# Patient Record
Sex: MALE | Race: BLACK OR AFRICAN AMERICAN | NOT HISPANIC OR LATINO | URBAN - METROPOLITAN AREA
[De-identification: names, ages, dates, MRNs, and addresses within clinical notes are randomized per-mention and may not be internally consistent; named-entity substitution may affect disease eponyms.]

---

## 2021-03-13 ENCOUNTER — INPATIENT (INPATIENT)
Facility: HOSPITAL | Age: 76
LOS: 3 days | Discharge: HOME HEALTH | End: 2021-03-17
Attending: INTERNAL MEDICINE | Admitting: INTERNAL MEDICINE
Payer: MEDICARE

## 2021-03-13 VITALS
TEMPERATURE: 98 F | WEIGHT: 229.94 LBS | RESPIRATION RATE: 17 BRPM | SYSTOLIC BLOOD PRESSURE: 84 MMHG | DIASTOLIC BLOOD PRESSURE: 55 MMHG | HEART RATE: 88 BPM | HEIGHT: 69 IN | OXYGEN SATURATION: 100 %

## 2021-03-13 DIAGNOSIS — R77.8 OTHER SPECIFIED ABNORMALITIES OF PLASMA PROTEINS: ICD-10-CM

## 2021-03-13 DIAGNOSIS — N30.01 ACUTE CYSTITIS WITH HEMATURIA: ICD-10-CM

## 2021-03-13 DIAGNOSIS — R33.9 RETENTION OF URINE, UNSPECIFIED: ICD-10-CM

## 2021-03-13 DIAGNOSIS — N17.9 ACUTE KIDNEY FAILURE, UNSPECIFIED: ICD-10-CM

## 2021-03-13 DIAGNOSIS — A41.9 SEPSIS, UNSPECIFIED ORGANISM: ICD-10-CM

## 2021-03-13 DIAGNOSIS — I95.9 HYPOTENSION, UNSPECIFIED: ICD-10-CM

## 2021-03-13 LAB
ALBUMIN SERPL ELPH-MCNC: 2.9 G/DL — LOW (ref 3.3–5)
ALP SERPL-CCNC: 63 U/L — SIGNIFICANT CHANGE UP (ref 40–120)
ALT FLD-CCNC: 52 U/L — SIGNIFICANT CHANGE UP (ref 12–78)
ANION GAP SERPL CALC-SCNC: 8 MMOL/L — SIGNIFICANT CHANGE UP (ref 5–17)
APPEARANCE UR: CLEAR — SIGNIFICANT CHANGE UP
APTT BLD: 27.5 SEC — SIGNIFICANT CHANGE UP (ref 27.5–35.5)
AST SERPL-CCNC: 71 U/L — HIGH (ref 15–37)
BACTERIA # UR AUTO: ABNORMAL
BASOPHILS # BLD AUTO: 0.01 K/UL — SIGNIFICANT CHANGE UP (ref 0–0.2)
BASOPHILS NFR BLD AUTO: 0.1 % — SIGNIFICANT CHANGE UP (ref 0–2)
BILIRUB SERPL-MCNC: 1.6 MG/DL — HIGH (ref 0.2–1.2)
BILIRUB UR-MCNC: ABNORMAL
BUN SERPL-MCNC: 58 MG/DL — HIGH (ref 7–23)
CALCIUM SERPL-MCNC: 8.4 MG/DL — LOW (ref 8.5–10.1)
CHLORIDE SERPL-SCNC: 96 MMOL/L — SIGNIFICANT CHANGE UP (ref 96–108)
CK SERPL-CCNC: 63 U/L — SIGNIFICANT CHANGE UP (ref 26–308)
CK SERPL-CCNC: 81 U/L — SIGNIFICANT CHANGE UP (ref 26–308)
CO2 SERPL-SCNC: 28 MMOL/L — SIGNIFICANT CHANGE UP (ref 22–31)
COLOR SPEC: ABNORMAL
COMMENT - URINE: SIGNIFICANT CHANGE UP
CREAT SERPL-MCNC: 3.54 MG/DL — HIGH (ref 0.5–1.3)
DIFF PNL FLD: NEGATIVE — SIGNIFICANT CHANGE UP
EOSINOPHIL # BLD AUTO: 0.05 K/UL — SIGNIFICANT CHANGE UP (ref 0–0.5)
EOSINOPHIL NFR BLD AUTO: 0.6 % — SIGNIFICANT CHANGE UP (ref 0–6)
EPI CELLS # UR: SIGNIFICANT CHANGE UP
FLUAV AG NPH QL: SIGNIFICANT CHANGE UP
FLUBV AG NPH QL: SIGNIFICANT CHANGE UP
GLUCOSE BLDC GLUCOMTR-MCNC: 124 MG/DL — HIGH (ref 70–99)
GLUCOSE SERPL-MCNC: 109 MG/DL — HIGH (ref 70–99)
GLUCOSE UR QL: NEGATIVE MG/DL — SIGNIFICANT CHANGE UP
GRAN CASTS # UR COMP ASSIST: ABNORMAL /LPF
HCT VFR BLD CALC: 37.8 % — LOW (ref 39–50)
HGB BLD-MCNC: 13.4 G/DL — SIGNIFICANT CHANGE UP (ref 13–17)
HYALINE CASTS # UR AUTO: ABNORMAL /LPF
IMM GRANULOCYTES NFR BLD AUTO: 0.4 % — SIGNIFICANT CHANGE UP (ref 0–1.5)
INR BLD: 0.98 RATIO — SIGNIFICANT CHANGE UP (ref 0.88–1.16)
KETONES UR-MCNC: ABNORMAL
LACTATE SERPL-SCNC: 1 MMOL/L — SIGNIFICANT CHANGE UP (ref 0.7–2)
LEUKOCYTE ESTERASE UR-ACNC: ABNORMAL
LYMPHOCYTES # BLD AUTO: 2.55 K/UL — SIGNIFICANT CHANGE UP (ref 1–3.3)
LYMPHOCYTES # BLD AUTO: 31.8 % — SIGNIFICANT CHANGE UP (ref 13–44)
MCHC RBC-ENTMCNC: 34.6 PG — HIGH (ref 27–34)
MCHC RBC-ENTMCNC: 35.4 GM/DL — SIGNIFICANT CHANGE UP (ref 32–36)
MCV RBC AUTO: 97.7 FL — SIGNIFICANT CHANGE UP (ref 80–100)
MONOCYTES # BLD AUTO: 0.47 K/UL — SIGNIFICANT CHANGE UP (ref 0–0.9)
MONOCYTES NFR BLD AUTO: 5.9 % — SIGNIFICANT CHANGE UP (ref 2–14)
NEUTROPHILS # BLD AUTO: 4.91 K/UL — SIGNIFICANT CHANGE UP (ref 1.8–7.4)
NEUTROPHILS NFR BLD AUTO: 61.2 % — SIGNIFICANT CHANGE UP (ref 43–77)
NITRITE UR-MCNC: NEGATIVE — SIGNIFICANT CHANGE UP
NRBC # BLD: 0 /100 WBCS — SIGNIFICANT CHANGE UP (ref 0–0)
PH UR: 5 — SIGNIFICANT CHANGE UP (ref 5–8)
PLATELET # BLD AUTO: 173 K/UL — SIGNIFICANT CHANGE UP (ref 150–400)
POTASSIUM SERPL-MCNC: 4 MMOL/L — SIGNIFICANT CHANGE UP (ref 3.5–5.3)
POTASSIUM SERPL-SCNC: 4 MMOL/L — SIGNIFICANT CHANGE UP (ref 3.5–5.3)
PROT SERPL-MCNC: 6.2 GM/DL — SIGNIFICANT CHANGE UP (ref 6–8.3)
PROT UR-MCNC: 30 MG/DL
PROTHROM AB SERPL-ACNC: 11.4 SEC — SIGNIFICANT CHANGE UP (ref 10.6–13.6)
RBC # BLD: 3.87 M/UL — LOW (ref 4.2–5.8)
RBC # FLD: 15.6 % — HIGH (ref 10.3–14.5)
RBC CASTS # UR COMP ASSIST: SIGNIFICANT CHANGE UP /HPF (ref 0–4)
SARS-COV-2 RNA SPEC QL NAA+PROBE: SIGNIFICANT CHANGE UP
SODIUM SERPL-SCNC: 132 MMOL/L — LOW (ref 135–145)
SP GR SPEC: 1.02 — SIGNIFICANT CHANGE UP (ref 1.01–1.02)
TROPONIN I SERPL-MCNC: 0.07 NG/ML — HIGH (ref 0.01–0.04)
TROPONIN I SERPL-MCNC: 0.08 NG/ML — HIGH (ref 0.01–0.04)
TROPONIN I SERPL-MCNC: 0.1 NG/ML — HIGH (ref 0.01–0.04)
UROBILINOGEN FLD QL: 4 MG/DL
WBC # BLD: 8.02 K/UL — SIGNIFICANT CHANGE UP (ref 3.8–10.5)
WBC # FLD AUTO: 8.02 K/UL — SIGNIFICANT CHANGE UP (ref 3.8–10.5)
WBC UR QL: ABNORMAL

## 2021-03-13 PROCEDURE — G1004: CPT

## 2021-03-13 PROCEDURE — 99285 EMERGENCY DEPT VISIT HI MDM: CPT

## 2021-03-13 PROCEDURE — 74176 CT ABD & PELVIS W/O CONTRAST: CPT | Mod: 26,MG

## 2021-03-13 PROCEDURE — 71045 X-RAY EXAM CHEST 1 VIEW: CPT | Mod: 26

## 2021-03-13 PROCEDURE — 93010 ELECTROCARDIOGRAM REPORT: CPT

## 2021-03-13 RX ORDER — CEFTRIAXONE 500 MG/1
1000 INJECTION, POWDER, FOR SOLUTION INTRAMUSCULAR; INTRAVENOUS EVERY 24 HOURS
Refills: 0 | Status: COMPLETED | OUTPATIENT
Start: 2021-03-14 | End: 2021-03-16

## 2021-03-13 RX ORDER — PANTOPRAZOLE SODIUM 20 MG/1
40 TABLET, DELAYED RELEASE ORAL
Refills: 0 | Status: DISCONTINUED | OUTPATIENT
Start: 2021-03-13 | End: 2021-03-17

## 2021-03-13 RX ORDER — CEFTRIAXONE 500 MG/1
1000 INJECTION, POWDER, FOR SOLUTION INTRAMUSCULAR; INTRAVENOUS ONCE
Refills: 0 | Status: COMPLETED | OUTPATIENT
Start: 2021-03-13 | End: 2021-03-13

## 2021-03-13 RX ORDER — SODIUM CHLORIDE 9 MG/ML
1000 INJECTION INTRAMUSCULAR; INTRAVENOUS; SUBCUTANEOUS
Refills: 0 | Status: DISCONTINUED | OUTPATIENT
Start: 2021-03-13 | End: 2021-03-17

## 2021-03-13 RX ORDER — SODIUM CHLORIDE 9 MG/ML
2200 INJECTION INTRAMUSCULAR; INTRAVENOUS; SUBCUTANEOUS ONCE
Refills: 0 | Status: COMPLETED | OUTPATIENT
Start: 2021-03-13 | End: 2021-03-13

## 2021-03-13 RX ORDER — ASPIRIN/CALCIUM CARB/MAGNESIUM 324 MG
325 TABLET ORAL ONCE
Refills: 0 | Status: COMPLETED | OUTPATIENT
Start: 2021-03-13 | End: 2021-03-13

## 2021-03-13 RX ORDER — HEPARIN SODIUM 5000 [USP'U]/ML
5000 INJECTION INTRAVENOUS; SUBCUTANEOUS EVERY 12 HOURS
Refills: 0 | Status: DISCONTINUED | OUTPATIENT
Start: 2021-03-13 | End: 2021-03-17

## 2021-03-13 RX ADMIN — SODIUM CHLORIDE 80 MILLILITER(S): 9 INJECTION INTRAMUSCULAR; INTRAVENOUS; SUBCUTANEOUS at 11:14

## 2021-03-13 RX ADMIN — CEFTRIAXONE 100 MILLIGRAM(S): 500 INJECTION, POWDER, FOR SOLUTION INTRAMUSCULAR; INTRAVENOUS at 08:48

## 2021-03-13 RX ADMIN — HEPARIN SODIUM 5000 UNIT(S): 5000 INJECTION INTRAVENOUS; SUBCUTANEOUS at 17:01

## 2021-03-13 RX ADMIN — Medication 325 MILLIGRAM(S): at 09:11

## 2021-03-13 RX ADMIN — SODIUM CHLORIDE 2200 MILLILITER(S): 9 INJECTION INTRAMUSCULAR; INTRAVENOUS; SUBCUTANEOUS at 07:41

## 2021-03-13 RX ADMIN — Medication 1 TABLET(S): at 11:14

## 2021-03-13 RX ADMIN — PANTOPRAZOLE SODIUM 40 MILLIGRAM(S): 20 TABLET, DELAYED RELEASE ORAL at 08:56

## 2021-03-13 NOTE — H&P ADULT - NSHPLABSRESULTS_GEN_ALL_CORE
LABS:                        13.4   8.02  )-----------( 173      ( 13 Mar 2021 07:20 )             37.8     03-    132<L>  |  96  |  58<H>  ----------------------------<  109<H>  4.0   |  28  |  3.54<H>    Ca    8.4<L>      13 Mar 2021 07:20    TPro  6.2  /  Alb  2.9<L>  /  TBili  1.6<H>  /  DBili  x   /  AST  71<H>  /  ALT  52  /  AlkPhos  63  03-13    PT/INR - ( 13 Mar 2021 07:20 )   PT: 11.4 sec;   INR: 0.98 ratio         PTT - ( 13 Mar 2021 07:20 )  PTT:27.5 sec  Urinalysis Basic - ( 13 Mar 2021 07:55 )    Color: Zunilda / Appearance: Clear / S.020 / pH: x  Gluc: x / Ketone: Trace  / Bili: Moderate / Urobili: 4 mg/dL   Blood: x / Protein: 30 mg/dL / Nitrite: Negative   Leuk Esterase: Trace / RBC: 0-2 /HPF / WBC 6-10   Sq Epi: x / Non Sq Epi: Few / Bacteria: Few      CAPILLARY BLOOD GLUCOSE      POCT Blood Glucose.: 124 mg/dL (13 Mar 2021 06:55)    CARDIAC MARKERS ( 13 Mar 2021 13:46 )  .104 ng/mL / x     / 63 U/L / x     / x      CARDIAC MARKERS ( 13 Mar 2021 07:20 )  .070 ng/mL / x     / x     / x     / x        Lactate, Blood: 1.0 mmol/L (21 @ 07:20)       < from: CT Abdomen and Pelvis No Cont (21 @ 08:24) >      FINDINGS:    LOWER CHEST: Within normal limits.    The evaluation of the solid organ parenchyma is limited without intravenous contrast.    LIVER: Within normal limits.  BILE DUCTS: Normal caliber.  GALLBLADDER: Within normal limits.  SPLEEN: Within normal limits.  PANCREAS: Within normal limits.  ADRENALS:  Nodular fusiform thickening of the bilateral adrenal glands.  KIDNEYS/URETERS:  Bilateral renal atrophy.  Bilateral renal cysts.  10 mm indeterminate cortical hypodense lesion.  No hydroureteronephrosis or obstructing ureteral calculus noted.    BLADDER: Collapsed around balloon Mueller catheter.  Minimal perivesical stranding.  REPRODUCTIVE ORGANS: Prostate within normal limits.    BOWEL: Focal calcification at the posterior fundus.  Colonic diverticulosis, without CT evidence of diverticulitis.  No bowel obstruction.  Appendix normal.  PERITONEUM: No ascites.    VESSELS: Atherosclerotic calcification, abdominal aorta normal in caliber.  RETROPERITONEUM/LYMPH NODES: No lymphadenopathy.  ABDOMINAL WALL: Within normal limits.    BONES: Asymmetric transitional lumbosacral vertebra. Degenerative changes spine.  Small nonspecific lytic focus superior left iliac bone.    IMPRESSION:    No CT evidence for obstructive uropathy.    Minimal perivesical stranding, correlate for cystitis.    Small indeterminate cortical lesion lower pole right kidney. This can be further evaluated with nonemergent ultrasonography.    Small focal lytic lesion superior left iliac bone.    Correlate clinically for marrow reconversion or marrow proliferative disorder.  Further evaluation could be obtained with nonemergent MRI as clinically indicated.    Other findings as discussed.

## 2021-03-13 NOTE — ED ADULT NURSE NOTE - OBJECTIVE STATEMENT
Pt alert and responsive with some fortgetfulness, presents to the ED with c/o chest pain Pt alert and responsive with some forgetfulness, presents to the ED with c/o chest pain, BIBA, from home,  c/o dizziness x 2 days, unable to urinate yesterday, decreased appetite, per wife.  per EMS, orthostatic hypotension. (PMH - ALZ, vertigo, gout, seizure).  per EMS pt is at baseline mentally

## 2021-03-13 NOTE — H&P ADULT - PROBLEM/PLAN-3
-- Message is from the [facility]--    Reason for Call:  Patient wife is calling back for the second time for a new RX to be sent in for the Accu Check test strips for 3 times a day, the current Rx is for 6 times a day and the insurance company will not cover 6 times     Caller Information       Type Contact Phone    02/25/2019 06:01 PM Phone (Incoming) YAMILET MONTOYA (Emergency Contact) [phone] (H)          Alternative phone number: n/a     Turnaround time given to caller:   \"This message will be sent to [state Provider's name]. The clinical team will fulfill your request as soon as they review your message when the office opens tomorrow.\"     DISPLAY PLAN FREE TEXT

## 2021-03-13 NOTE — ED ADULT NURSE NOTE - NSIMPLEMENTINTERV_GEN_ALL_ED
Implemented All Universal Safety Interventions:  [place] to call system. Call bell, personal items and telephone within reach. Instruct patient to call for assistance. Room bathroom lighting operational. Non-slip footwear when patient is off stretcher. Physically safe environment: no spills, clutter or unnecessary equipment. Stretcher in lowest position, wheels locked, appropriate side rails in place.

## 2021-03-13 NOTE — ED ADULT TRIAGE NOTE - CHIEF COMPLAINT QUOTE
BIBA, from home,  c/o dizziness x 2 days, unable to urinate yesterday, decreased appetite, per wife.  per EMS, orthostatic hypotension. (PMH - ALZ, veritgo, gout, seizure).  per EMS pt is at baseline mentally

## 2021-03-13 NOTE — H&P ADULT - PROBLEM SELECTOR PROBLEM 1
BS report to Honorio CUELLAR. Pt resting on ander, no needs.    Sepsis, due to unspecified organism, unspecified whether acute organ dysfunction present

## 2021-03-13 NOTE — ED PROVIDER NOTE - PHYSICAL EXAMINATION
Gen: no acute distress, well appearing, awake, alert and oriented to person and place  Cardiac: regular rate and rhythm, +S1S2  Pulm: Clear to auscultation bilaterally  Abd: soft, nontender, no guarding  Back: neg CVA ttp, nontender spine  Extremity: no edema, no deformity, warm and well perfused, FROM all extremities    Neuro: awake, alert, oriented x 3, sensorimotor intact

## 2021-03-13 NOTE — CONSULT NOTE ADULT - SUBJECTIVE AND OBJECTIVE BOX
Patient is a 75y old  Male who presents with a chief complaint of Weakness (13 Mar 2021 15:43)        PAST MEDICAL & SURGICAL HISTORY:      Summary of admission HPI:                PREVIOUS DIAGNOSTIC TESTING:      ECHO  FINDINGS:    STRESS  FINDINGS:    CATHETERIZATION  FINDINGS:    ELECTROPHYSIOLOGY STUDY  FINDINGS:    CAROTID ULTRASOUND:  FINDINGS    VENOUS DUPLEX SCAN:  FINDINGS:    CHEST CT PULMONARY ANGIO with IV Contrast:  FINDINGS:  MEDICATIONS  (STANDING):  cefTRIAXone   IVPB 1000 milliGRAM(s) IV Intermittent every 24 hours  heparin   Injectable 5000 Unit(s) SubCutaneous every 12 hours  multivitamin 1 Tablet(s) Oral daily  pantoprazole    Tablet 40 milliGRAM(s) Oral before breakfast  sodium chloride 0.9%. 1000 milliLiter(s) (80 mL/Hr) IV Continuous <Continuous>    MEDICATIONS  (PRN):      FAMILY HISTORY:      SOCIAL HISTORY:    CIGARETTES:    ALCOHOL:    REVIEW OF SYSTEMS:    CONSTITUTIONAL: No fever, weight loss, chills, shakes, or fatigue  EYES: No eye pain, visual disturbances, or discharge  ENMT:  No difficulty hearing, tinnitus, vertigo; No sinus or throat pain  NECK: No pain or stiffness  BREASTS: No pain, masses, or nipple discharge  RESPIRATORY: No cough, wheezing, hemoptysis, or shortness of breath  CARDIOVASCULAR: No chest pain, dyspnea, palpitations, dizziness, syncope, paroxysmal nocturnal dyspnea, orthopnea, or arm or leg swelling  GASTROINTESTINAL: No abdominal  or epigastric pain, nausea, vomiting, hematemesis, diarrhea, constipation, melena or bright red blood.  GENITOURINARY: No dysuria, nocturia, hematuria, or urinary incontinence  NEUROLOGICAL: No headaches, memory loss, slurred speech, limb weakness, loss of strength, numbness, or tremors  SKIN: No itching, burning, rashes, or lesions   LYMPH NODES: No enlarged glands  ENDOCRINE: No heat or cold intolerance, or hair loss  MUSCULOSKELETAL: No joint pain or swelling, muscle, back, or extremity pain  PSYCHIATRIC: No depression, anxiety, or difficulty sleeping  HEME/LYMPH: No easy bruising or bleeding gums  ALLERY AND IMMUNOLOGIC: No hives or rash.      Vital Signs Last 24 Hrs  T(C): 36.7 (13 Mar 2021 10:12), Max: 36.7 (13 Mar 2021 10:12)  T(F): 98 (13 Mar 2021 10:12), Max: 98 (13 Mar 2021 10:12)  HR: 61 (13 Mar 2021 10:12) (61 - 88)  BP: 145/88 (13 Mar 2021 10:12) (84/55 - 145/88)  BP(mean): --  RR: 17 (13 Mar 2021 10:12) (16 - 17)  SpO2: 100% (13 Mar 2021 10:12) (97% - 100%)    PHYSICAL EXAM:    GENERAL: In no apparent distress, well nourished, and hydrated.  HEAD:  Atraumatic, Normocephalic  EYES: EOMI, PERRLA, conjunctiva and sclera clear  ENMT: No tonsillar erythema, exudates, or enlargement; Moist mucous membranes, Good dentition, No lesions  NECK: Supple and normal thyroid.  No JVD or carotid bruit.  Carotid pulse is 2+ bilaterally.  HEART: Regular rate and rhythm; No murmurs, rubs, or gallops.  PULMONARY: Clear to auscultation and perfusion.  No rales, wheezing, or rhonchi bilaterally.  ABDOMEN: Soft, Nontender, Nondistended; Bowel sounds present  EXTREMITIES:  2+ Peripheral Pulses, No clubbing, cyanosis, or edema  LYMPH: No lymphadenopathy noted  NEUROLOGICAL: Grossly nonfocal          INTERPRETATION OF TELEMETRY:    ECG:    I&O's Detail    13 Mar 2021 06:01  -  13 Mar 2021 18:44  --------------------------------------------------------  IN:  Total IN: 0 mL    OUT:    Voided (mL): 150 mL  Total OUT: 150 mL    Total NET: -150 mL          LABS:                        13.4   8.02  )-----------( 173      ( 13 Mar 2021 07:20 )             37.8     03-13    132<L>  |  96  |  58<H>  ----------------------------<  109<H>  4.0   |  28  |  3.54<H>    Ca    8.4<L>      13 Mar 2021 07:20    TPro  6.2  /  Alb  2.9<L>  /  TBili  1.6<H>  /  DBili  x   /  AST  71<H>  /  ALT  52  /  AlkPhos  63  03-13    CARDIAC MARKERS ( 13 Mar 2021 13:46 )  .104 ng/mL / x     / 63 U/L / x     / x      CARDIAC MARKERS ( 13 Mar 2021 07:20 )  .070 ng/mL / x     / x     / x     / x          PT/INR - ( 13 Mar 2021 07:20 )   PT: 11.4 sec;   INR: 0.98 ratio         PTT - ( 13 Mar 2021 07:20 )  PTT:27.5 sec  Urinalysis Basic - ( 13 Mar 2021 07:55 )    Color: Zunilda / Appearance: Clear / S.020 / pH: x  Gluc: x / Ketone: Trace  / Bili: Moderate / Urobili: 4 mg/dL   Blood: x / Protein: 30 mg/dL / Nitrite: Negative   Leuk Esterase: Trace / RBC: 0-2 /HPF / WBC 6-10   Sq Epi: x / Non Sq Epi: Few / Bacteria: Few      BNP  I&O's Detail    13 Mar 2021 06:01  -  13 Mar 2021 18:44  --------------------------------------------------------  IN:  Total IN: 0 mL    OUT:    Voided (mL): 150 mL  Total OUT: 150 mL    Total NET: -150 mL        Daily Height in cm: 165.1 (13 Mar 2021 10:12)    Daily     RADIOLOGY & ADDITIONAL STUDIES:

## 2021-03-13 NOTE — H&P ADULT - NSHPPHYSICALEXAM_GEN_ALL_CORE
PHYSICAL EXAM:  GENERAL: NAD,   HEAD:  Atraumatic, Normocephalic  EYES: PERRLA, conjunctiva and sclera clear  ENMT: Intact  NECK: Supple,  NERVOUS SYSTEM:  Alert & Oriented X2; Motor Strength 4/5   CHEST/LUNG: Clear bilaterally; No rales, rhonchi, wheezing, or rubs  HEART: Regular rate and rhythm; No murmurs, rubs, or gallops  ABDOMEN: Soft, Mild periumbilical tenderness; , Nondistended; Bowel sounds present  EXTREMITIES:  No clubbing, cyanosis, or edema  LYMPH: No lymphadenopathy noted  SKIN: No rashes or lesions

## 2021-03-13 NOTE — H&P ADULT - HISTORY OF PRESENT ILLNESS
76yo, BM with h/o Dementia, (only oriented to person and place at baseline), gout, arthritis, HTN, seizure presents to ED for evaluation of weakness,  urinary retention since yesterday and orthostatic hypotension.   Pt was complaining of weakness and dizziness whenever he tried to walk for  2 days. Every time he gets up, he feels dizziness. Urinary retention x 2 days. No complaint of abd pain, chest pain, shortness of breath.   Notes dysuria.   In ER, Mueller placed and 300ml urine noted and BP 80/58.

## 2021-03-13 NOTE — ED ADULT NURSE NOTE - ED STAT RN HANDOFF DETAILS
Kenia Salgado RN, aware pt is confused and pulls at tubes, Mueller cath intact and iv fluids, made aware of trop elevated

## 2021-03-13 NOTE — ED PROVIDER NOTE - CLINICAL SUMMARY MEDICAL DECISION MAKING FREE TEXT BOX
76 yo male with dizziness, orthostatic hypotension, urinary retention x 2 days - will check labs, ekg, imaging, hydrate, reassess

## 2021-03-13 NOTE — ED PROVIDER NOTE - OBJECTIVE STATEMENT
76 yo male presents to ED for evaluation of dizziness, urinary retention since yesterday and orthostatic hypotension. Pt denies chest pain, shortness of breath, abd pain at this time. 76 yo male with PMH dementia, (only oriented to person and place at baseline), gout, arthritis, HTN, seizure presents to ED for evaluation of dizziness, urinary retention since yesterday and orthostatic hypotension. Pt denies chest pain, shortness of breath, abd pain at this time. Took his BP meds this morning only.   Wife: Dajuan Woods   Pt was complaining of weakness and dizziness whenever he tried to talk x 2 days. Every time he gets up, he feels dizziness. Urinary retention x 2 days. No complaint of abd pain, chest pain, shortness of breath.   PCP: Dr. Flores

## 2021-03-14 LAB
ALBUMIN SERPL ELPH-MCNC: 3 G/DL — LOW (ref 3.3–5)
ALP SERPL-CCNC: 60 U/L — SIGNIFICANT CHANGE UP (ref 40–120)
ALT FLD-CCNC: 56 U/L — SIGNIFICANT CHANGE UP (ref 12–78)
ANION GAP SERPL CALC-SCNC: 6 MMOL/L — SIGNIFICANT CHANGE UP (ref 5–17)
AST SERPL-CCNC: 58 U/L — HIGH (ref 15–37)
BASOPHILS # BLD AUTO: 0 K/UL — SIGNIFICANT CHANGE UP (ref 0–0.2)
BASOPHILS NFR BLD AUTO: 0 % — SIGNIFICANT CHANGE UP (ref 0–2)
BILIRUB SERPL-MCNC: 2 MG/DL — HIGH (ref 0.2–1.2)
BUN SERPL-MCNC: 44 MG/DL — HIGH (ref 7–23)
CALCIUM SERPL-MCNC: 8.7 MG/DL — SIGNIFICANT CHANGE UP (ref 8.5–10.1)
CHLORIDE SERPL-SCNC: 108 MMOL/L — SIGNIFICANT CHANGE UP (ref 96–108)
CK SERPL-CCNC: 72 U/L — SIGNIFICANT CHANGE UP (ref 26–308)
CO2 SERPL-SCNC: 27 MMOL/L — SIGNIFICANT CHANGE UP (ref 22–31)
CREAT SERPL-MCNC: 1.72 MG/DL — HIGH (ref 0.5–1.3)
CULTURE RESULTS: NO GROWTH — SIGNIFICANT CHANGE UP
EOSINOPHIL # BLD AUTO: 0.1 K/UL — SIGNIFICANT CHANGE UP (ref 0–0.5)
EOSINOPHIL NFR BLD AUTO: 1.1 % — SIGNIFICANT CHANGE UP (ref 0–6)
GLUCOSE SERPL-MCNC: 87 MG/DL — SIGNIFICANT CHANGE UP (ref 70–99)
HCT VFR BLD CALC: 36.8 % — LOW (ref 39–50)
HCV AB S/CO SERPL IA: 0.62 S/CO — SIGNIFICANT CHANGE UP (ref 0–0.99)
HCV AB SERPL-IMP: SIGNIFICANT CHANGE UP
HGB BLD-MCNC: 12.9 G/DL — LOW (ref 13–17)
IMM GRANULOCYTES NFR BLD AUTO: 0.5 % — SIGNIFICANT CHANGE UP (ref 0–1.5)
LYMPHOCYTES # BLD AUTO: 1.94 K/UL — SIGNIFICANT CHANGE UP (ref 1–3.3)
LYMPHOCYTES # BLD AUTO: 22 % — SIGNIFICANT CHANGE UP (ref 13–44)
MCHC RBC-ENTMCNC: 34.2 PG — HIGH (ref 27–34)
MCHC RBC-ENTMCNC: 35.1 GM/DL — SIGNIFICANT CHANGE UP (ref 32–36)
MCV RBC AUTO: 97.6 FL — SIGNIFICANT CHANGE UP (ref 80–100)
MONOCYTES # BLD AUTO: 0.81 K/UL — SIGNIFICANT CHANGE UP (ref 0–0.9)
MONOCYTES NFR BLD AUTO: 9.2 % — SIGNIFICANT CHANGE UP (ref 2–14)
NEUTROPHILS # BLD AUTO: 5.91 K/UL — SIGNIFICANT CHANGE UP (ref 1.8–7.4)
NEUTROPHILS NFR BLD AUTO: 67.2 % — SIGNIFICANT CHANGE UP (ref 43–77)
NRBC # BLD: 0 /100 WBCS — SIGNIFICANT CHANGE UP (ref 0–0)
PLATELET # BLD AUTO: 179 K/UL — SIGNIFICANT CHANGE UP (ref 150–400)
POTASSIUM SERPL-MCNC: 4.2 MMOL/L — SIGNIFICANT CHANGE UP (ref 3.5–5.3)
POTASSIUM SERPL-SCNC: 4.2 MMOL/L — SIGNIFICANT CHANGE UP (ref 3.5–5.3)
PROCALCITONIN SERPL-MCNC: 0.2 NG/ML — HIGH (ref 0.02–0.1)
PROT SERPL-MCNC: 6.3 GM/DL — SIGNIFICANT CHANGE UP (ref 6–8.3)
RBC # BLD: 3.77 M/UL — LOW (ref 4.2–5.8)
RBC # FLD: 15.5 % — HIGH (ref 10.3–14.5)
SODIUM SERPL-SCNC: 141 MMOL/L — SIGNIFICANT CHANGE UP (ref 135–145)
SPECIMEN SOURCE: SIGNIFICANT CHANGE UP
TROPONIN I SERPL-MCNC: 0.07 NG/ML — HIGH (ref 0.01–0.04)
WBC # BLD: 8.8 K/UL — SIGNIFICANT CHANGE UP (ref 3.8–10.5)
WBC # FLD AUTO: 8.8 K/UL — SIGNIFICANT CHANGE UP (ref 3.8–10.5)

## 2021-03-14 PROCEDURE — 76775 US EXAM ABDO BACK WALL LIM: CPT | Mod: 26

## 2021-03-14 RX ADMIN — PANTOPRAZOLE SODIUM 40 MILLIGRAM(S): 20 TABLET, DELAYED RELEASE ORAL at 06:16

## 2021-03-14 RX ADMIN — HEPARIN SODIUM 5000 UNIT(S): 5000 INJECTION INTRAVENOUS; SUBCUTANEOUS at 06:16

## 2021-03-14 RX ADMIN — Medication 1 TABLET(S): at 11:28

## 2021-03-14 RX ADMIN — SODIUM CHLORIDE 80 MILLILITER(S): 9 INJECTION INTRAMUSCULAR; INTRAVENOUS; SUBCUTANEOUS at 06:15

## 2021-03-14 RX ADMIN — HEPARIN SODIUM 5000 UNIT(S): 5000 INJECTION INTRAVENOUS; SUBCUTANEOUS at 17:22

## 2021-03-14 RX ADMIN — CEFTRIAXONE 100 MILLIGRAM(S): 500 INJECTION, POWDER, FOR SOLUTION INTRAMUSCULAR; INTRAVENOUS at 11:28

## 2021-03-14 RX ADMIN — Medication 100 MILLIGRAM(S): at 23:05

## 2021-03-15 LAB
ANION GAP SERPL CALC-SCNC: 10 MMOL/L — SIGNIFICANT CHANGE UP (ref 5–17)
BUN SERPL-MCNC: 30 MG/DL — HIGH (ref 7–23)
CALCIUM SERPL-MCNC: 8.7 MG/DL — SIGNIFICANT CHANGE UP (ref 8.5–10.1)
CHLORIDE SERPL-SCNC: 105 MMOL/L — SIGNIFICANT CHANGE UP (ref 96–108)
CO2 SERPL-SCNC: 25 MMOL/L — SIGNIFICANT CHANGE UP (ref 22–31)
CREAT SERPL-MCNC: 1.35 MG/DL — HIGH (ref 0.5–1.3)
GLUCOSE SERPL-MCNC: 99 MG/DL — SIGNIFICANT CHANGE UP (ref 70–99)
HCT VFR BLD CALC: 35.2 % — LOW (ref 39–50)
HGB BLD-MCNC: 12.4 G/DL — LOW (ref 13–17)
MCHC RBC-ENTMCNC: 34.3 PG — HIGH (ref 27–34)
MCHC RBC-ENTMCNC: 35.2 GM/DL — SIGNIFICANT CHANGE UP (ref 32–36)
MCV RBC AUTO: 97.2 FL — SIGNIFICANT CHANGE UP (ref 80–100)
NRBC # BLD: 0 /100 WBCS — SIGNIFICANT CHANGE UP (ref 0–0)
PLATELET # BLD AUTO: 178 K/UL — SIGNIFICANT CHANGE UP (ref 150–400)
POTASSIUM SERPL-MCNC: 3.5 MMOL/L — SIGNIFICANT CHANGE UP (ref 3.5–5.3)
POTASSIUM SERPL-SCNC: 3.5 MMOL/L — SIGNIFICANT CHANGE UP (ref 3.5–5.3)
RBC # BLD: 3.62 M/UL — LOW (ref 4.2–5.8)
RBC # FLD: 15.5 % — HIGH (ref 10.3–14.5)
SODIUM SERPL-SCNC: 140 MMOL/L — SIGNIFICANT CHANGE UP (ref 135–145)
WBC # BLD: 6.62 K/UL — SIGNIFICANT CHANGE UP (ref 3.8–10.5)
WBC # FLD AUTO: 6.62 K/UL — SIGNIFICANT CHANGE UP (ref 3.8–10.5)

## 2021-03-15 RX ORDER — TAMSULOSIN HYDROCHLORIDE 0.4 MG/1
1 CAPSULE ORAL
Qty: 0 | Refills: 0 | DISCHARGE

## 2021-03-15 RX ORDER — FINASTERIDE 5 MG/1
1 TABLET, FILM COATED ORAL
Qty: 0 | Refills: 0 | DISCHARGE

## 2021-03-15 RX ORDER — FINASTERIDE 5 MG/1
5 TABLET, FILM COATED ORAL DAILY
Refills: 0 | Status: DISCONTINUED | OUTPATIENT
Start: 2021-03-15 | End: 2021-03-17

## 2021-03-15 RX ORDER — AMLODIPINE BESYLATE 2.5 MG/1
1 TABLET ORAL
Qty: 0 | Refills: 0 | DISCHARGE

## 2021-03-15 RX ORDER — ALLOPURINOL 300 MG
100 TABLET ORAL DAILY
Refills: 0 | Status: DISCONTINUED | OUTPATIENT
Start: 2021-03-15 | End: 2021-03-17

## 2021-03-15 RX ORDER — METOPROLOL TARTRATE 50 MG
25 TABLET ORAL
Refills: 0 | Status: DISCONTINUED | OUTPATIENT
Start: 2021-03-15 | End: 2021-03-17

## 2021-03-15 RX ORDER — ALLOPURINOL 300 MG
300 TABLET ORAL DAILY
Refills: 0 | Status: DISCONTINUED | OUTPATIENT
Start: 2021-03-15 | End: 2021-03-15

## 2021-03-15 RX ORDER — LEVETIRACETAM 250 MG/1
500 TABLET, FILM COATED ORAL
Refills: 0 | Status: DISCONTINUED | OUTPATIENT
Start: 2021-03-15 | End: 2021-03-17

## 2021-03-15 RX ORDER — TAMSULOSIN HYDROCHLORIDE 0.4 MG/1
0.4 CAPSULE ORAL AT BEDTIME
Refills: 0 | Status: DISCONTINUED | OUTPATIENT
Start: 2021-03-15 | End: 2021-03-17

## 2021-03-15 RX ORDER — ALLOPURINOL 300 MG
1 TABLET ORAL
Qty: 0 | Refills: 0 | DISCHARGE

## 2021-03-15 RX ORDER — AMLODIPINE BESYLATE 2.5 MG/1
10 TABLET ORAL DAILY
Refills: 0 | Status: DISCONTINUED | OUTPATIENT
Start: 2021-03-15 | End: 2021-03-17

## 2021-03-15 RX ORDER — LEVETIRACETAM 250 MG/1
1 TABLET, FILM COATED ORAL
Qty: 0 | Refills: 0 | DISCHARGE

## 2021-03-15 RX ADMIN — Medication 100 MILLIGRAM(S): at 17:39

## 2021-03-15 RX ADMIN — FINASTERIDE 5 MILLIGRAM(S): 5 TABLET, FILM COATED ORAL at 17:39

## 2021-03-15 RX ADMIN — HEPARIN SODIUM 5000 UNIT(S): 5000 INJECTION INTRAVENOUS; SUBCUTANEOUS at 17:39

## 2021-03-15 RX ADMIN — AMLODIPINE BESYLATE 10 MILLIGRAM(S): 2.5 TABLET ORAL at 12:45

## 2021-03-15 RX ADMIN — SODIUM CHLORIDE 80 MILLILITER(S): 9 INJECTION INTRAMUSCULAR; INTRAVENOUS; SUBCUTANEOUS at 06:26

## 2021-03-15 RX ADMIN — Medication 1 TABLET(S): at 11:46

## 2021-03-15 RX ADMIN — HEPARIN SODIUM 5000 UNIT(S): 5000 INJECTION INTRAVENOUS; SUBCUTANEOUS at 06:26

## 2021-03-15 RX ADMIN — PANTOPRAZOLE SODIUM 40 MILLIGRAM(S): 20 TABLET, DELAYED RELEASE ORAL at 06:26

## 2021-03-15 RX ADMIN — LEVETIRACETAM 500 MILLIGRAM(S): 250 TABLET, FILM COATED ORAL at 17:39

## 2021-03-15 RX ADMIN — CEFTRIAXONE 100 MILLIGRAM(S): 500 INJECTION, POWDER, FOR SOLUTION INTRAMUSCULAR; INTRAVENOUS at 11:46

## 2021-03-15 RX ADMIN — TAMSULOSIN HYDROCHLORIDE 0.4 MILLIGRAM(S): 0.4 CAPSULE ORAL at 21:26

## 2021-03-15 RX ADMIN — Medication 25 MILLIGRAM(S): at 17:39

## 2021-03-16 LAB
ANION GAP SERPL CALC-SCNC: 8 MMOL/L — SIGNIFICANT CHANGE UP (ref 5–17)
BUN SERPL-MCNC: 18 MG/DL — SIGNIFICANT CHANGE UP (ref 7–23)
CALCIUM SERPL-MCNC: 8.7 MG/DL — SIGNIFICANT CHANGE UP (ref 8.5–10.1)
CHLORIDE SERPL-SCNC: 104 MMOL/L — SIGNIFICANT CHANGE UP (ref 96–108)
CO2 SERPL-SCNC: 28 MMOL/L — SIGNIFICANT CHANGE UP (ref 22–31)
CREAT SERPL-MCNC: 1.02 MG/DL — SIGNIFICANT CHANGE UP (ref 0.5–1.3)
GLUCOSE SERPL-MCNC: 81 MG/DL — SIGNIFICANT CHANGE UP (ref 70–99)
HCT VFR BLD CALC: 35.2 % — LOW (ref 39–50)
HGB BLD-MCNC: 12.2 G/DL — LOW (ref 13–17)
MCHC RBC-ENTMCNC: 33.9 PG — SIGNIFICANT CHANGE UP (ref 27–34)
MCHC RBC-ENTMCNC: 34.7 GM/DL — SIGNIFICANT CHANGE UP (ref 32–36)
MCV RBC AUTO: 97.8 FL — SIGNIFICANT CHANGE UP (ref 80–100)
NRBC # BLD: 0 /100 WBCS — SIGNIFICANT CHANGE UP (ref 0–0)
PLATELET # BLD AUTO: 211 K/UL — SIGNIFICANT CHANGE UP (ref 150–400)
POTASSIUM SERPL-MCNC: 3.4 MMOL/L — LOW (ref 3.5–5.3)
POTASSIUM SERPL-SCNC: 3.4 MMOL/L — LOW (ref 3.5–5.3)
RBC # BLD: 3.6 M/UL — LOW (ref 4.2–5.8)
RBC # FLD: 15.7 % — HIGH (ref 10.3–14.5)
SARS-COV-2 IGG SERPL QL IA: NEGATIVE — SIGNIFICANT CHANGE UP
SARS-COV-2 IGM SERPL IA-ACNC: 0.07 INDEX — SIGNIFICANT CHANGE UP
SODIUM SERPL-SCNC: 140 MMOL/L — SIGNIFICANT CHANGE UP (ref 135–145)
WBC # BLD: 4.53 K/UL — SIGNIFICANT CHANGE UP (ref 3.8–10.5)
WBC # FLD AUTO: 4.53 K/UL — SIGNIFICANT CHANGE UP (ref 3.8–10.5)

## 2021-03-16 RX ORDER — POTASSIUM CHLORIDE 20 MEQ
40 PACKET (EA) ORAL EVERY 4 HOURS
Refills: 0 | Status: COMPLETED | OUTPATIENT
Start: 2021-03-16 | End: 2021-03-16

## 2021-03-16 RX ADMIN — AMLODIPINE BESYLATE 10 MILLIGRAM(S): 2.5 TABLET ORAL at 05:19

## 2021-03-16 RX ADMIN — PANTOPRAZOLE SODIUM 40 MILLIGRAM(S): 20 TABLET, DELAYED RELEASE ORAL at 05:19

## 2021-03-16 RX ADMIN — Medication 1 TABLET(S): at 11:34

## 2021-03-16 RX ADMIN — LEVETIRACETAM 500 MILLIGRAM(S): 250 TABLET, FILM COATED ORAL at 05:19

## 2021-03-16 RX ADMIN — HEPARIN SODIUM 5000 UNIT(S): 5000 INJECTION INTRAVENOUS; SUBCUTANEOUS at 17:13

## 2021-03-16 RX ADMIN — HEPARIN SODIUM 5000 UNIT(S): 5000 INJECTION INTRAVENOUS; SUBCUTANEOUS at 05:19

## 2021-03-16 RX ADMIN — SODIUM CHLORIDE 80 MILLILITER(S): 9 INJECTION INTRAMUSCULAR; INTRAVENOUS; SUBCUTANEOUS at 17:17

## 2021-03-16 RX ADMIN — FINASTERIDE 5 MILLIGRAM(S): 5 TABLET, FILM COATED ORAL at 11:35

## 2021-03-16 RX ADMIN — TAMSULOSIN HYDROCHLORIDE 0.4 MILLIGRAM(S): 0.4 CAPSULE ORAL at 21:13

## 2021-03-16 RX ADMIN — Medication 25 MILLIGRAM(S): at 21:14

## 2021-03-16 RX ADMIN — Medication 25 MILLIGRAM(S): at 05:19

## 2021-03-16 RX ADMIN — Medication 40 MILLIEQUIVALENT(S): at 11:34

## 2021-03-16 RX ADMIN — CEFTRIAXONE 100 MILLIGRAM(S): 500 INJECTION, POWDER, FOR SOLUTION INTRAMUSCULAR; INTRAVENOUS at 11:35

## 2021-03-16 RX ADMIN — Medication 40 MILLIEQUIVALENT(S): at 09:45

## 2021-03-16 RX ADMIN — LEVETIRACETAM 500 MILLIGRAM(S): 250 TABLET, FILM COATED ORAL at 17:13

## 2021-03-16 RX ADMIN — Medication 100 MILLIGRAM(S): at 12:30

## 2021-03-16 NOTE — PROGRESS NOTE ADULT - ATTENDING COMMENTS
Cardiac consult noted; Problem: Elevated troponin. Recommendation: No cardiac intervention required at this time. medical management.  Continue current care and treatment.
PT  Continue current care and treatment.
PT  Discontinue Mueller  Discharge planning.  Continue current care and treatment.

## 2021-03-16 NOTE — PROGRESS NOTE ADULT - PROBLEM SELECTOR PLAN 4
IVF  Renal sono  Monitoring renal function.

## 2021-03-16 NOTE — PHYSICAL THERAPY INITIAL EVALUATION ADULT - MANUAL MUSCLE TESTING RESULTS, REHAB EVAL
Full range of motion of upper and lower extremities, no joint tenderness/swelling. dec MMT/grossly assessed due to

## 2021-03-16 NOTE — PROGRESS NOTE ADULT - SUBJECTIVE AND OBJECTIVE BOX
Patient is a 75y old  Male who presents with a chief complaint of Weakness (15 Mar 2021 13:46)      SUBJECTIVE/OBJECTIVE:    Without complaints. Patient resting comfortably.     MEDICATIONS  (STANDING):  allopurinol 100 milliGRAM(s) Oral daily  amLODIPine   Tablet 10 milliGRAM(s) Oral daily  cefTRIAXone   IVPB 1000 milliGRAM(s) IV Intermittent every 24 hours  finasteride 5 milliGRAM(s) Oral daily  heparin   Injectable 5000 Unit(s) SubCutaneous every 12 hours  levETIRAcetam 500 milliGRAM(s) Oral two times a day  metoprolol tartrate 25 milliGRAM(s) Oral two times a day  multivitamin 1 Tablet(s) Oral daily  pantoprazole    Tablet 40 milliGRAM(s) Oral before breakfast  potassium chloride   Powder 40 milliEquivalent(s) Oral every 4 hours  sodium chloride 0.9%. 1000 milliLiter(s) (80 mL/Hr) IV Continuous <Continuous>  tamsulosin 0.4 milliGRAM(s) Oral at bedtime    MEDICATIONS  (PRN):      Allergies    No Known Allergies    Intolerances          Vital Signs Last 24 Hrs  T(C): 36.6 (16 Mar 2021 10:42), Max: 36.7 (15 Mar 2021 15:58)  T(F): 97.9 (16 Mar 2021 10:42), Max: 98 (15 Mar 2021 15:58)  HR: 74 (16 Mar 2021 10:42) (68 - 76)  BP: 100/63 (16 Mar 2021 10:42) (100/63 - 168/89)  BP(mean): --  RR: 18 (16 Mar 2021 10:42) (18 - 18)  SpO2: 98% (16 Mar 2021 10:42) (98% - 100%)    PHYSICAL EXAM:  GENERAL: NAD, well-groomed, well-developed  HEAD:  Atraumatic, Normocephalic  EYES: EOMI, PERRLA, conjunctiva and sclera clear  ENMT: No tonsillar erythema, exudates, or enlargement; Moist mucous membranes, No lesions  NECK: Supple, No JVD, Normal thyroid  NERVOUS SYSTEM:  Alert & Oriented X3; Motor Strength 5/5 B/L upper and lower extremities; DTRs 2+ intact and symmetric  CHEST/LUNG: Clear bilaterally; No rales, rhonchi, wheezing, or rubs  HEART: Regular rate and rhythm; No murmurs, rubs, or gallops  ABDOMEN: Soft, Nontender, Nondistended; Bowel sounds present  EXTREMITIES:  2+ Peripheral Pulses, No clubbing, cyanosis, or edema  LYMPH: No lymphadenopathy noted  SKIN: No rashes or lesions    LABS:                        12.2   4.53  )-----------( 211      ( 16 Mar 2021 06:57 )             35.2     03-16    140  |  104  |  18  ----------------------------<  81  3.4<L>   |  28  |  1.02    Ca    8.7      16 Mar 2021 06:57          CAPILLARY BLOOD GLUCOSE              RADIOLOGY & ADDITIONAL TESTS:        Consultant(s) Notes Reviewed:  [ ] YES  [ ] NO  
Patient is a 75y old  Male who presents with a chief complaint of Weakness (14 Mar 2021 13:38)      SUBJECTIVE/OBJECTIVE:    Comfortable.    MEDICATIONS  (STANDING):  allopurinol 100 milliGRAM(s) Oral daily  amLODIPine   Tablet 10 milliGRAM(s) Oral daily  cefTRIAXone   IVPB 1000 milliGRAM(s) IV Intermittent every 24 hours  finasteride 5 milliGRAM(s) Oral daily  heparin   Injectable 5000 Unit(s) SubCutaneous every 12 hours  levETIRAcetam 500 milliGRAM(s) Oral two times a day  metoprolol tartrate 25 milliGRAM(s) Oral two times a day  multivitamin 1 Tablet(s) Oral daily  pantoprazole    Tablet 40 milliGRAM(s) Oral before breakfast  sodium chloride 0.9%. 1000 milliLiter(s) (80 mL/Hr) IV Continuous <Continuous>  tamsulosin 0.4 milliGRAM(s) Oral at bedtime    MEDICATIONS  (PRN):      Allergies    No Known Allergies    Intolerances          Vital Signs Last 24 Hrs  T(C): 36.4 (15 Mar 2021 11:00), Max: 36.8 (15 Mar 2021 05:37)  T(F): 97.6 (15 Mar 2021 11:00), Max: 98.3 (15 Mar 2021 05:37)  HR: 86 (15 Mar 2021 11:00) (67 - 94)  BP: 161/98 (15 Mar 2021 11:00) (150/93 - 162/92)  BP(mean): --  RR: 19 (15 Mar 2021 11:00) (18 - 19)  SpO2: 97% (15 Mar 2021 11:00) (96% - 100%)    PHYSICAL EXAM:  GENERAL: NAD, well-groomed, well-developed  HEAD:  Atraumatic, Normocephalic  EYES: EOMI, PERRLA, conjunctiva and sclera clear  ENMT: No tonsillar erythema, exudates, or enlargement; Moist mucous membranes, No lesions  NECK: Supple, No JVD, Normal thyroid  NERVOUS SYSTEM:  Alert & Oriented X3; Motor Strength 5/5 B/L upper and lower extremities; DTRs 2+ intact and symmetric  CHEST/LUNG: Clear bilaterally; No rales, rhonchi, wheezing, or rubs  HEART: Regular rate and rhythm; No murmurs, rubs, or gallops  ABDOMEN: Soft, Nontender, Nondistended; Bowel sounds present  EXTREMITIES:  2+ Peripheral Pulses, No clubbing, cyanosis, or edema  LYMPH: No lymphadenopathy noted  SKIN: No rashes or lesions    LABS:                        12.4   6.62  )-----------( 178      ( 15 Mar 2021 07:24 )             35.2     03-15    140  |  105  |  30<H>  ----------------------------<  99  3.5   |  25  |  1.35<H>    Ca    8.7      15 Mar 2021 07:24    TPro  6.3  /  Alb  3.0<L>  /  TBili  2.0<H>  /  DBili  x   /  AST  58<H>  /  ALT  56  /  AlkPhos  60  03-14        CAPILLARY BLOOD GLUCOSE        CARDIAC MARKERS ( 14 Mar 2021 06:35 )  .068 ng/mL / x     / 72 U/L / x     / x      CARDIAC MARKERS ( 13 Mar 2021 22:51 )  .080 ng/mL / x     / 81 U/L / x     / x            RADIOLOGY & ADDITIONAL TESTS:        < from: US Renal (03.14.21 @ 10:56) >  FINDINGS:    Right kidney: 9.9 cm. No hydronephrosis.. Increased in echogenicity. 1.9 cm cyst in the lower pole and 7 mm cyst in the mid kidney.    Left kidney: 11.0 cm. No hydronephrosis. Increased in echogenicity. 3.8 cm cyst in the lower pole.    Urinary bladder: Underdistended with a Mueller catheter.    IMPRESSION:    No hydronephrosis.    Kidneys increased in echogenicity suggestive of medical renal disease.      < end of copied text >  Consultant(s) Notes Reviewed:  [ xx] YES  [ ] NO  
Patient is a 75y old  Male who presents with a chief complaint of Weakness (13 Mar 2021 18:43)      SUBJECTIVE/OBJECTIVE:    Without complaints. Patient resting comfortably.     MEDICATIONS  (STANDING):  cefTRIAXone   IVPB 1000 milliGRAM(s) IV Intermittent every 24 hours  heparin   Injectable 5000 Unit(s) SubCutaneous every 12 hours  multivitamin 1 Tablet(s) Oral daily  pantoprazole    Tablet 40 milliGRAM(s) Oral before breakfast  sodium chloride 0.9%. 1000 milliLiter(s) (80 mL/Hr) IV Continuous <Continuous>    MEDICATIONS  (PRN):      Allergies    No Known Allergies    Intolerances          Vital Signs Last 24 Hrs  T(C): 36.5 (14 Mar 2021 10:17), Max: 36.8 (14 Mar 2021 00:05)  T(F): 97.7 (14 Mar 2021 10:17), Max: 98.2 (14 Mar 2021 00:05)  HR: 66 (14 Mar 2021 10:17) (66 - 81)  BP: 120/68 (14 Mar 2021 10:17) (120/68 - 168/97)  BP(mean): --  RR: 17 (14 Mar 2021 10:17) (17 - 18)  SpO2: 98% (14 Mar 2021 10:17) (96% - 99%)    PHYSICAL EXAM:  GENERAL: NAD, well-groomed, well-developed  HEAD:  Atraumatic, Normocephalic  EYES: EOMI, PERRLA, conjunctiva and sclera clear  ENMT: Intact  NECK: Supple, No JVD, Normal thyroid  NERVOUS SYSTEM:  Alert ; Motor Strength 5/5   CHEST/LUNG: Clear bilaterally; No rales, rhonchi, wheezing, or rubs  HEART: Regular rate and rhythm; No murmurs, rubs, or gallops  ABDOMEN: Soft, Nontender, Nondistended; Bowel sounds present  EXTREMITIES:   No clubbing, cyanosis, or edema  LYMPH: No lymphadenopathy noted  SKIN: No rashes or lesions    LABS:                        12.9   8.80  )-----------( 179      ( 14 Mar 2021 06:35 )             36.8     03-14    141  |  108  |  44<H>  ----------------------------<  87  4.2   |  27  |  1.72<H>    Ca    8.7      14 Mar 2021 06:35    TPro  6.3  /  Alb  3.0<L>  /  TBili  2.0<H>  /  DBili  x   /  AST  58<H>  /  ALT  56  /  AlkPhos  60  03-14    PT/INR - ( 13 Mar 2021 07:20 )   PT: 11.4 sec;   INR: 0.98 ratio         PTT - ( 13 Mar 2021 07:20 )  PTT:27.5 sec  Urinalysis Basic - ( 13 Mar 2021 07:55 )    Color: Zunilda / Appearance: Clear / S.020 / pH: x  Gluc: x / Ketone: Trace  / Bili: Moderate / Urobili: 4 mg/dL   Blood: x / Protein: 30 mg/dL / Nitrite: Negative   Leuk Esterase: Trace / RBC: 0-2 /HPF / WBC 6-10   Sq Epi: x / Non Sq Epi: Few / Bacteria: Few      CAPILLARY BLOOD GLUCOSE        CARDIAC MARKERS ( 14 Mar 2021 06:35 )  .068 ng/mL / x     / 72 U/L / x     / x      CARDIAC MARKERS ( 13 Mar 2021 22:51 )  .080 ng/mL / x     / 81 U/L / x     / x      CARDIAC MARKERS ( 13 Mar 2021 13:46 )  .104 ng/mL / x     / 63 U/L / x     / x      CARDIAC MARKERS ( 13 Mar 2021 07:20 )  .070 ng/mL / x     / x     / x     / x            RADIOLOGY & ADDITIONAL TESTS:        Consultant(s) Notes Reviewed:  [xx ] YES  [ ] NO

## 2021-03-16 NOTE — PROGRESS NOTE ADULT - PROBLEM SELECTOR PLAN 1
IVF  IV Abx  Blood and urine culture  Check procalcitonin

## 2021-03-16 NOTE — PROGRESS NOTE ADULT - ASSESSMENT
Admitted for Sepsis 2/2 UTI with Urinary Retention, LARS, Hypotension and elevated Troponin.  

## 2021-03-16 NOTE — PHYSICAL THERAPY INITIAL EVALUATION ADULT - ADDITIONAL COMMENTS
pt lives with spouse and DTR in a private home with 4 steps to enter and 12 steps to bedroom, was indep w/o AD and ADL PLOF.

## 2021-03-17 ENCOUNTER — TRANSCRIPTION ENCOUNTER (OUTPATIENT)
Age: 76
End: 2021-03-17

## 2021-03-17 VITALS
RESPIRATION RATE: 18 BRPM | OXYGEN SATURATION: 98 % | TEMPERATURE: 98 F | DIASTOLIC BLOOD PRESSURE: 82 MMHG | SYSTOLIC BLOOD PRESSURE: 141 MMHG | HEART RATE: 80 BPM

## 2021-03-17 RX ORDER — POTASSIUM CHLORIDE 20 MEQ
40 PACKET (EA) ORAL ONCE
Refills: 0 | Status: COMPLETED | OUTPATIENT
Start: 2021-03-17 | End: 2021-03-17

## 2021-03-17 RX ORDER — METOPROLOL TARTRATE 50 MG
1 TABLET ORAL
Qty: 60 | Refills: 0
Start: 2021-03-17 | End: 2021-04-15

## 2021-03-17 RX ORDER — POTASSIUM CHLORIDE 20 MEQ
40 PACKET (EA) ORAL ONCE
Refills: 0 | Status: DISCONTINUED | OUTPATIENT
Start: 2021-03-17 | End: 2021-03-17

## 2021-03-17 RX ADMIN — AMLODIPINE BESYLATE 10 MILLIGRAM(S): 2.5 TABLET ORAL at 05:15

## 2021-03-17 RX ADMIN — HEPARIN SODIUM 5000 UNIT(S): 5000 INJECTION INTRAVENOUS; SUBCUTANEOUS at 17:41

## 2021-03-17 RX ADMIN — FINASTERIDE 5 MILLIGRAM(S): 5 TABLET, FILM COATED ORAL at 11:01

## 2021-03-17 RX ADMIN — Medication 1 TABLET(S): at 11:01

## 2021-03-17 RX ADMIN — Medication 40 MILLIEQUIVALENT(S): at 10:38

## 2021-03-17 RX ADMIN — HEPARIN SODIUM 5000 UNIT(S): 5000 INJECTION INTRAVENOUS; SUBCUTANEOUS at 05:16

## 2021-03-17 RX ADMIN — Medication 25 MILLIGRAM(S): at 05:16

## 2021-03-17 RX ADMIN — LEVETIRACETAM 500 MILLIGRAM(S): 250 TABLET, FILM COATED ORAL at 05:16

## 2021-03-17 RX ADMIN — PANTOPRAZOLE SODIUM 40 MILLIGRAM(S): 20 TABLET, DELAYED RELEASE ORAL at 05:15

## 2021-03-17 RX ADMIN — Medication 25 MILLIGRAM(S): at 17:40

## 2021-03-17 RX ADMIN — LEVETIRACETAM 500 MILLIGRAM(S): 250 TABLET, FILM COATED ORAL at 17:41

## 2021-03-17 RX ADMIN — Medication 100 MILLIGRAM(S): at 11:01

## 2021-03-17 NOTE — DISCHARGE NOTE PROVIDER - NSDCMRMEDTOKEN_GEN_ALL_CORE_FT
allopurinol 300 mg oral tablet: 1 tab(s) orally once a day  amLODIPine 10 mg oral tablet: 1 tab(s) orally once a day  Augmentin 875 mg-125 mg oral tablet: 1 tab(s) orally every 12 hours   finasteride 5 mg oral tablet: 1 tab(s) orally once a day  levETIRAcetam 500 mg oral tablet: 1 tab(s) orally 2 times a day  metoprolol tartrate 25 mg oral tablet: 1 tab(s) orally 2 times a day   Multiple Vitamins oral tablet: 1 tab(s) orally once a day  tamsulosin 0.4 mg oral capsule: 1 cap(s) orally once a day

## 2021-03-17 NOTE — DISCHARGE NOTE PROVIDER - CARE PROVIDER_API CALL
Missy Cabrera  MEDICINE  [address], [address]  [place] [address]  Phone: [phone]  Fax: [phone]  Follow Up Time:     Reji Casarez  INTERNAL MEDICINE  [address], [address]  [place] [address]  Phone: [phone]  Fax: [phone]  Follow Up Time:

## 2021-03-17 NOTE — DISCHARGE NOTE PROVIDER - HOSPITAL COURSE
74yo, BM with h/o Dementia, (only oriented to person and place at baseline), gout, arthritis, HTN, seizure presents to ED for evaluation of weakness,  urinary retention since yesterday and orthostatic hypotension.   Pt was complaining of weakness and dizziness whenever he tried to walk for  2 days. Every time he gets up, he feels dizziness. Urinary retention x 2 days. No complaint of abd pain, chest pain, shortness of breath.   Notes dysuria.   In ER, Mueller placed and 300ml urine noted and BP 80/58.      Admitted for Sepsis 2/2 UTI with Urinary Retention, LARS, Hypotension and elevated Troponin.      Problem/Plan - 1:  ·  Problem: Sepsis, due to unspecified organism, unspecified whether acute organ dysfunction present.  Plan: IVF  IV Abx  Blood and urine culture  Check procalcitonin.     Problem/Plan - 2:  ·  Problem: Acute cystitis with hematuria.  Plan: IV Rocephin.     Problem/Plan - 3:  ·  Problem: Hypotension.  Plan: IVF  Monitoring BP.     Problem/Plan - 4:  ·  Problem: LARS (acute kidney injury).  Plan: IVF  Renal sono  Monitoring renal function.     Problem/Plan - 5:  ·  Problem: Elevated troponin.  Plan: CE.   Cardiac noted;  Problem: Elevated troponin. Recommendation: No cardiac intervention required at this time. medical management.    Problem/Plan - 6:  Problem: Urinary retention. Plan: Mueller  Continue Flomax.      PT  Discontinue Mueller  Discharge planning.      TEST;    < from: US Renal (03.14.21 @ 10:56) >    IMPRESSION:    No hydronephrosis.    Kidneys increased in echogenicity suggestive of medical renal disease.      < end of copied text >    LABS:                        12.2   4.53  )-----------( 211      ( 16 Mar 2021 06:57 )             35.2     03-16    140  |  104  |  18  ----------------------------<  81  3.4<L>   |  28  |  1.02    Ca    8.7      16 Mar 2021 06:57          CAPILLARY BLOOD GLUCOSE

## 2021-03-17 NOTE — DISCHARGE NOTE PROVIDER - NSDCCPGOAL_GEN_ALL_CORE_FT
To get better and follow your care plan as instructed.    Please follow up with your primary care physician regarding your hospitalization in 2 weeks,  Home PT

## 2021-03-17 NOTE — DISCHARGE NOTE NURSING/CASE MANAGEMENT/SOCIAL WORK - PATIENT PORTAL LINK FT
You can access the FollowMyHealth Patient Portal offered by [facility] by registering at the following website: [email or web address]. By joining [employer]’s FollowMyHealth portal, you will also be able to view your health information using other applications (apps) compatible with our system.

## 2021-03-17 NOTE — DISCHARGE NOTE PROVIDER - NSDCCPCAREPLAN_GEN_ALL_CORE_FT
PRINCIPAL DISCHARGE DIAGNOSIS  Diagnosis: Sepsis, due to unspecified organism, unspecified whether acute organ dysfunction present  Assessment and Plan of Treatment: Resolved      SECONDARY DISCHARGE DIAGNOSES  Diagnosis: Elevated troponin  Assessment and Plan of Treatment: Resolved    Diagnosis: Acute cystitis with hematuria  Assessment and Plan of Treatment: Medication; Augmentin    Diagnosis: Urinary retention  Assessment and Plan of Treatment: medication; Flomax

## 2021-03-18 LAB
CULTURE RESULTS: SIGNIFICANT CHANGE UP
CULTURE RESULTS: SIGNIFICANT CHANGE UP
SPECIMEN SOURCE: SIGNIFICANT CHANGE UP
SPECIMEN SOURCE: SIGNIFICANT CHANGE UP

## 2021-03-19 ENCOUNTER — NON-APPOINTMENT (OUTPATIENT)
Age: 76
End: 2021-03-19

## 2021-03-25 DIAGNOSIS — M10.9 GOUT, UNSPECIFIED: ICD-10-CM

## 2021-03-25 DIAGNOSIS — I10 ESSENTIAL (PRIMARY) HYPERTENSION: ICD-10-CM

## 2021-03-25 DIAGNOSIS — R77.8 OTHER SPECIFIED ABNORMALITIES OF PLASMA PROTEINS: ICD-10-CM

## 2021-03-25 DIAGNOSIS — G40.909 EPILEPSY, UNSPECIFIED, NOT INTRACTABLE, WITHOUT STATUS EPILEPTICUS: ICD-10-CM

## 2021-03-25 DIAGNOSIS — I95.9 HYPOTENSION, UNSPECIFIED: ICD-10-CM

## 2021-03-25 DIAGNOSIS — A41.9 SEPSIS, UNSPECIFIED ORGANISM: ICD-10-CM

## 2021-03-25 DIAGNOSIS — M19.90 UNSPECIFIED OSTEOARTHRITIS, UNSPECIFIED SITE: ICD-10-CM

## 2021-03-25 DIAGNOSIS — N30.01 ACUTE CYSTITIS WITH HEMATURIA: ICD-10-CM

## 2021-03-25 DIAGNOSIS — N17.9 ACUTE KIDNEY FAILURE, UNSPECIFIED: ICD-10-CM

## 2021-03-25 DIAGNOSIS — R33.9 RETENTION OF URINE, UNSPECIFIED: ICD-10-CM

## 2021-03-25 DIAGNOSIS — R53.1 WEAKNESS: ICD-10-CM

## 2021-03-25 DIAGNOSIS — F03.90 UNSPECIFIED DEMENTIA WITHOUT BEHAVIORAL DISTURBANCE: ICD-10-CM

## 2021-03-25 PROBLEM — Z00.00 ENCOUNTER FOR PREVENTIVE HEALTH EXAMINATION: Status: ACTIVE | Noted: 2021-03-25

## 2021-03-25 RX ORDER — LEVETIRACETAM 500 MG/1
500 TABLET, FILM COATED ORAL TWICE DAILY
Refills: 0 | Status: ACTIVE | COMMUNITY
Start: 2021-03-25

## 2021-03-25 RX ORDER — MULTIVITAMIN
TABLET ORAL DAILY
Refills: 0 | Status: ACTIVE | COMMUNITY
Start: 2021-03-25

## 2021-03-25 RX ORDER — ALLOPURINOL 300 MG/1
300 TABLET ORAL DAILY
Refills: 0 | Status: ACTIVE | COMMUNITY
Start: 2021-03-25

## 2021-03-25 RX ORDER — AMLODIPINE BESYLATE 10 MG/1
10 TABLET ORAL DAILY
Refills: 0 | Status: ACTIVE | COMMUNITY
Start: 2021-03-25

## 2021-03-25 RX ORDER — METOPROLOL TARTRATE 25 MG/1
25 TABLET, FILM COATED ORAL
Refills: 0 | Status: ACTIVE | COMMUNITY
Start: 2021-03-25

## 2021-03-25 RX ORDER — TAMSULOSIN HYDROCHLORIDE 0.4 MG/1
0.4 CAPSULE ORAL DAILY
Refills: 0 | Status: ACTIVE | COMMUNITY
Start: 2021-03-25

## 2021-03-25 RX ORDER — FINASTERIDE 5 MG/1
5 TABLET, FILM COATED ORAL DAILY
Refills: 0 | Status: ACTIVE | COMMUNITY
Start: 2021-03-25

## 2021-04-12 NOTE — ED PROVIDER NOTE - WET READ LAUNCH FT
There are no Wet Read(s) to document. Ivermectin Counseling:  Patient instructed to take medication on an empty stomach with a full glass of water.  Patient informed of potential adverse effects including but not limited to nausea, diarrhea, dizziness, itching, and swelling of the extremities or lymph nodes.  The patient verbalized understanding of the proper use and possible adverse effects of ivermectin.  All of the patient's questions and concerns were addressed. There is 1 Wet Read(s) to document.

## 2021-06-02 ENCOUNTER — APPOINTMENT (OUTPATIENT)
Dept: DISASTER EMERGENCY | Facility: OTHER | Age: 76
End: 2021-06-02

## 2022-02-12 ENCOUNTER — APPOINTMENT (OUTPATIENT)
Dept: DISASTER EMERGENCY | Facility: OTHER | Age: 77
End: 2022-02-12
Payer: MEDICARE

## 2022-02-12 PROCEDURE — 0064A: CPT

## 2023-05-04 NOTE — PATIENT PROFILE ADULT - NSPROSPHOSPCHAPLAINYN_GEN_A_NUR
[Home] : at home, [unfilled] , at the time of the visit. 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 Patient feels well.  no